# Patient Record
Sex: MALE | Race: WHITE | NOT HISPANIC OR LATINO | Employment: FULL TIME | URBAN - METROPOLITAN AREA
[De-identification: names, ages, dates, MRNs, and addresses within clinical notes are randomized per-mention and may not be internally consistent; named-entity substitution may affect disease eponyms.]

---

## 2023-04-18 DIAGNOSIS — J45.30 MILD PERSISTENT ASTHMA WITHOUT COMPLICATION (HHS-HCC): Primary | ICD-10-CM

## 2023-04-18 RX ORDER — ALBUTEROL SULFATE 90 UG/1
2 AEROSOL, METERED RESPIRATORY (INHALATION) EVERY 4 HOURS PRN
COMMUNITY
End: 2023-04-18 | Stop reason: SDUPTHER

## 2023-04-18 RX ORDER — ALBUTEROL SULFATE 90 UG/1
2 AEROSOL, METERED RESPIRATORY (INHALATION) EVERY 4 HOURS PRN
Qty: 18 G | Refills: 1 | Status: SHIPPED | OUTPATIENT
Start: 2023-04-18 | End: 2023-06-06 | Stop reason: SDUPTHER

## 2023-05-09 ENCOUNTER — OFFICE VISIT (OUTPATIENT)
Dept: PEDIATRICS | Facility: CLINIC | Age: 23
End: 2023-05-09
Payer: COMMERCIAL

## 2023-05-09 VITALS — BODY MASS INDEX: 22.39 KG/M2 | WEIGHT: 166.2 LBS

## 2023-05-09 DIAGNOSIS — D36.9 DERMOID CYST: Primary | ICD-10-CM

## 2023-05-09 PROCEDURE — 99213 OFFICE O/P EST LOW 20 MIN: CPT | Performed by: NURSE PRACTITIONER

## 2023-05-09 RX ORDER — CETIRIZINE HYDROCHLORIDE 10 MG/1
10 TABLET ORAL
COMMUNITY
Start: 2016-03-28

## 2023-05-09 NOTE — PROGRESS NOTES
Subjective     Joseph Quintanilla is a 22 y.o. male who presents for Mass.    Today he is accompanied by self.     HPI  Presents with lump to right side of torso noticed over the last week. Small lump. No pain to site. Overall doing well otherwise. Area soft and moveable.     Review of Systems    Constitutional: Negative for fever, change in appetite, change in sleep, change in behavior  ENT: Negative for ear pain or drainage, nasal congestion or rhinorrhea, sneezing, hoarseness, sore throat  Respiratory: Negative for cough, shortness of breath, increased work of breathing, wheezing  Gastrointestinal: Negative for abdominal pain, vomiting, diarrhea, constipation  Integumentary: Negative for rash or lesions. Positive for mass to ride side of torso.    Objective   Wt 75.4 kg (166 lb 3.2 oz)   BMI 22.39 kg/m²   BSA: 1.96 meters squared  Growth percentiles: Facility age limit for growth %narendra is 20 years. Facility age limit for growth %narendra is 20 years.     Physical Exam    Gen: Well-appearing, well-hydrated, in NAD.  Skin: soft moveable non tender lump to right side of torso around 2 cm.     Assessment/Plan   Mass feels like a dermoid cyst. Will continue to monitor and notify me if getting bigger.     Problem List Items Addressed This Visit    None

## 2023-06-06 DIAGNOSIS — J45.30 MILD PERSISTENT ASTHMA WITHOUT COMPLICATION (HHS-HCC): ICD-10-CM

## 2023-06-06 RX ORDER — ALBUTEROL SULFATE 90 UG/1
2 AEROSOL, METERED RESPIRATORY (INHALATION) EVERY 4 HOURS PRN
Qty: 18 G | Refills: 1 | Status: SHIPPED | OUTPATIENT
Start: 2023-06-06 | End: 2023-07-06